# Patient Record
Sex: FEMALE | Race: BLACK OR AFRICAN AMERICAN | NOT HISPANIC OR LATINO | URBAN - METROPOLITAN AREA
[De-identification: names, ages, dates, MRNs, and addresses within clinical notes are randomized per-mention and may not be internally consistent; named-entity substitution may affect disease eponyms.]

---
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
## 2022-07-14 ENCOUNTER — OFFICE VISIT (OUTPATIENT)
Dept: URGENT CARE | Facility: URGENT CARE | Age: 22
End: 2022-07-14
Payer: MEDICARE

## 2022-07-14 VITALS
SYSTOLIC BLOOD PRESSURE: 124 MMHG | HEART RATE: 84 BPM | BODY MASS INDEX: 21.8 KG/M2 | OXYGEN SATURATION: 100 % | RESPIRATION RATE: 12 BRPM | DIASTOLIC BLOOD PRESSURE: 80 MMHG | WEIGHT: 133 LBS | TEMPERATURE: 97 F

## 2022-07-14 DIAGNOSIS — N94.6 DYSMENORRHEA: ICD-10-CM

## 2022-07-14 DIAGNOSIS — R11.2 NAUSEA AND VOMITING, INTRACTABILITY OF VOMITING NOT SPECIFIED, UNSPECIFIED VOMITING TYPE: Primary | ICD-10-CM

## 2022-07-14 LAB
ALBUMIN UR-MCNC: ABNORMAL MG/DL
ANION GAP SERPL CALCULATED.3IONS-SCNC: 8 MMOL/L (ref 3–14)
APPEARANCE UR: CLEAR
BILIRUB UR QL STRIP: NEGATIVE
BUN SERPL-MCNC: 7 MG/DL (ref 7–30)
CALCIUM SERPL-MCNC: 8.9 MG/DL (ref 8.5–10.1)
CHLORIDE BLD-SCNC: 110 MMOL/L (ref 94–109)
CO2 SERPL-SCNC: 24 MMOL/L (ref 20–32)
COLOR UR AUTO: YELLOW
CREAT SERPL-MCNC: 0.72 MG/DL (ref 0.52–1.04)
ERYTHROCYTE [DISTWIDTH] IN BLOOD BY AUTOMATED COUNT: 11.9 % (ref 10–15)
GFR SERPL CREATININE-BSD FRML MDRD: >90 ML/MIN/1.73M2
GLUCOSE BLD-MCNC: 94 MG/DL (ref 70–99)
GLUCOSE UR STRIP-MCNC: NEGATIVE MG/DL
HCG UR QL: NEGATIVE
HCT VFR BLD AUTO: 36.7 % (ref 35–47)
HGB BLD-MCNC: 12.2 G/DL (ref 11.7–15.7)
HGB UR QL STRIP: ABNORMAL
KETONES UR STRIP-MCNC: NEGATIVE MG/DL
LEUKOCYTE ESTERASE UR QL STRIP: NEGATIVE
MCH RBC QN AUTO: 29.1 PG (ref 26.5–33)
MCHC RBC AUTO-ENTMCNC: 33.2 G/DL (ref 31.5–36.5)
MCV RBC AUTO: 88 FL (ref 78–100)
NITRATE UR QL: NEGATIVE
PH UR STRIP: 8 [PH] (ref 5–7)
PLATELET # BLD AUTO: 212 10E3/UL (ref 150–450)
POTASSIUM BLD-SCNC: 3.9 MMOL/L (ref 3.4–5.3)
RBC # BLD AUTO: 4.19 10E6/UL (ref 3.8–5.2)
RBC #/AREA URNS AUTO: ABNORMAL /HPF
SODIUM SERPL-SCNC: 142 MMOL/L (ref 133–144)
SP GR UR STRIP: 1.02 (ref 1–1.03)
SQUAMOUS #/AREA URNS AUTO: ABNORMAL /LPF
TSH SERPL DL<=0.005 MIU/L-ACNC: 1.07 MU/L (ref 0.4–4)
UROBILINOGEN UR STRIP-ACNC: 1 E.U./DL
WBC # BLD AUTO: 6.6 10E3/UL (ref 4–11)
WBC #/AREA URNS AUTO: ABNORMAL /HPF

## 2022-07-14 PROCEDURE — 85027 COMPLETE CBC AUTOMATED: CPT | Performed by: FAMILY MEDICINE

## 2022-07-14 PROCEDURE — 96372 THER/PROPH/DIAG INJ SC/IM: CPT | Performed by: FAMILY MEDICINE

## 2022-07-14 PROCEDURE — 99204 OFFICE O/P NEW MOD 45 MIN: CPT | Mod: 25 | Performed by: FAMILY MEDICINE

## 2022-07-14 PROCEDURE — 80048 BASIC METABOLIC PNL TOTAL CA: CPT | Performed by: FAMILY MEDICINE

## 2022-07-14 PROCEDURE — 81001 URINALYSIS AUTO W/SCOPE: CPT | Performed by: FAMILY MEDICINE

## 2022-07-14 PROCEDURE — 81025 URINE PREGNANCY TEST: CPT | Performed by: FAMILY MEDICINE

## 2022-07-14 PROCEDURE — 84443 ASSAY THYROID STIM HORMONE: CPT | Performed by: FAMILY MEDICINE

## 2022-07-14 PROCEDURE — 36415 COLL VENOUS BLD VENIPUNCTURE: CPT | Performed by: FAMILY MEDICINE

## 2022-07-14 RX ORDER — ONDANSETRON 4 MG/1
4 TABLET, ORALLY DISINTEGRATING ORAL EVERY 8 HOURS PRN
Qty: 12 TABLET | Refills: 0 | Status: SHIPPED | OUTPATIENT
Start: 2022-07-14

## 2022-07-14 RX ORDER — KETOROLAC TROMETHAMINE 30 MG/ML
30 INJECTION, SOLUTION INTRAMUSCULAR; INTRAVENOUS ONCE
Status: COMPLETED | OUTPATIENT
Start: 2022-07-14 | End: 2022-07-14

## 2022-07-14 RX ORDER — NAPROXEN 500 MG/1
500 TABLET ORAL 2 TIMES DAILY PRN
Qty: 40 TABLET | Refills: 0 | Status: SHIPPED | OUTPATIENT
Start: 2022-07-14

## 2022-07-14 RX ORDER — ONDANSETRON 4 MG/1
4 TABLET, ORALLY DISINTEGRATING ORAL ONCE
Status: COMPLETED | OUTPATIENT
Start: 2022-07-14 | End: 2022-07-14

## 2022-07-14 RX ADMIN — KETOROLAC TROMETHAMINE 30 MG: 30 INJECTION, SOLUTION INTRAMUSCULAR; INTRAVENOUS at 15:12

## 2022-07-14 RX ADMIN — ONDANSETRON 4 MG: 4 TABLET, ORALLY DISINTEGRATING ORAL at 15:13

## 2022-07-15 ENCOUNTER — TELEPHONE (OUTPATIENT)
Dept: URGENT CARE | Facility: URGENT CARE | Age: 22
End: 2022-07-15

## 2022-07-15 NOTE — TELEPHONE ENCOUNTER
----- Message from Troy Jenkins MD sent at 7/14/2022  9:04 PM CDT -----  Your thyroid test is normal. The electrolytes and kidney tests are normal. I will have my nurse try to call you with the results to make sure you get the message. Troy Jenkins MD

## 2022-07-15 NOTE — PROGRESS NOTES
Assessment & Plan     Nausea and vomiting, intractability of vomiting not specified, unspecified vomiting type  likely related to dysmenorrhea-improved with zofran here in clinic.    ondansetron (ZOFRAN ODT) ODT tab 4 mg  - HCG qualitative urine  - UA macro with reflex to Microscopic and Culture - [facility] Collect  - HCG qualitative urine  - UA macro with reflex to Microscopic and Culture - [facility] Collect  - Urine Microscopic  - ondansetron (ZOFRAN ODT) 4 MG ODT tab  Dispense: 12 tablet; Refill: 0    Dysmenorrhea  This has been a long time problem for her. This month has been a bit worse than usual. The toradol helped. Imaging does not seem necessary today. We talked about pursuing further testing today but feels comfortable with the plan as it is with close follow up this coming week. She knows to go to the ER with worsening/recurrent symptoms as she potentially would need imaging in that case.    - ketorolac (TORADOL) injection 30 mg  - CBC with platelets  - Basic metabolic panel  - TSH with free T4 reflex  - CBC with platelets  - Basic metabolic panel  - TSH with free T4 reflex  - naproxen (NAPROSYN) 500 MG tablet  Dispense: 40 tablet; Refill: 0  - ondansetron (ZOFRAN ODT) 4 MG ODT tab  Dispense: 12 tablet; Refill: 0       [id number]}    Return in about 5 days (around 7/19/2022) for follow up with your regular clinic if needed.    Troy Jenkins MD  [facility]    ------------------------------------------------------------------------  Subjective     Ester Up presents to clinic today for the following health issues:  chief complaint  HPI  22 yo with hx of painful periods who present on day number one of her period with bad abd. cramping and nausea/vomiting. No fever. No vag discharge.     Denies sti risks. Never had intercourse.     No alcohol, no tobacco, rare Nsaids.       Review of Systems  No const, derm, gi symptoms otherwise. No ent symptoms. No neuro symptoms. At times feels some lightheadedness.        Objective    /80   Pulse 84   Temp 97  F (36.1  C) (Tympanic)   Resp 12   Wt 60.3 kg (133 lb)   LMP 07/14/2022   SpO2 100%   BMI 21.80 kg/m    Physical Exam   GENERAL: healthy, alert and no distress  RESP: lungs clear to auscultation - no rales, rhonchi or wheezes  CV: regular rate and rhythm, normal S1 S2, no S3 or S4, no murmur, click or rub, no peripheral edema and peripheral pulses strong  ABDOMEN: tenderness lower abdomen. No masses.   : deferred  exam.   MS: no gross musculoskeletal defects noted, no edema  SKIN: no suspicious lesions or rashes  NEURO: Normal strength and tone, mentation intact and speech normal    Results for orders placed or performed in visit on 07/14/22   CBC with platelets     Status: Normal   Result Value Ref Range    WBC Count 6.6 4.0 - 11.0 10e3/uL    RBC Count 4.19 3.80 - 5.20 10e6/uL    Hemoglobin 12.2 11.7 - 15.7 g/dL    Hematocrit 36.7 35.0 - 47.0 %    MCV 88 78 - 100 fL    MCH 29.1 26.5 - 33.0 pg    MCHC 33.2 31.5 - 36.5 g/dL    RDW 11.9 10.0 - 15.0 %    Platelet Count 212 150 - 450 10e3/uL   Basic metabolic panel     Status: Abnormal   Result Value Ref Range    Sodium 142 133 - 144 mmol/L    Potassium 3.9 3.4 - 5.3 mmol/L    Chloride 110 (H) 94 - 109 mmol/L    Carbon Dioxide (CO2) 24 20 - 32 mmol/L    Anion Gap 8 3 - 14 mmol/L    Urea Nitrogen 7 7 - 30 mg/dL    Creatinine 0.72 0.52 - 1.04 mg/dL    Calcium 8.9 8.5 - 10.1 mg/dL    Glucose 94 70 - 99 mg/dL    GFR Estimate >90 >60 mL/min/1.73m2   TSH with free T4 reflex     Status: Normal   Result Value Ref Range    TSH 1.07 0.40 - 4.00 mU/L   HCG qualitative urine     Status: Normal   Result Value Ref Range    hCG Urine Qualitative Negative Negative   UA macro with reflex to Microscopic and Culture - Clinc Collect     Status: Abnormal    Specimen: Urine, Clean Catch   Result Value Ref Range    Color Urine Yellow Colorless, Straw, Light Yellow, Yellow    Appearance Urine Clear Clear    Glucose Urine Negative  Negative mg/dL    Bilirubin Urine Negative Negative    Ketones Urine Negative Negative mg/dL    Specific Gravity Urine 1.020 1.003 - 1.035    Blood Urine Large (A) Negative    pH Urine 8.0 (H) 5.0 - 7.0    Protein Albumin Urine Trace (A) Negative mg/dL    Urobilinogen Urine 1.0 0.2, 1.0 E.U./dL    Nitrite Urine Negative Negative    Leukocyte Esterase Urine Negative Negative   Urine Microscopic     Status: Abnormal   Result Value Ref Range    RBC Urine  (A) 0-2 /HPF /HPF    WBC Urine None Seen 0-5 /HPF /HPF    Squamous Epithelials Urine Few (A) None Seen /LPF    Narrative    Urine Culture not indicated

## 2022-08-09 ENCOUNTER — OFFICE VISIT (OUTPATIENT)
Dept: URGENT CARE | Facility: URGENT CARE | Age: 22
End: 2022-08-09
Payer: MEDICARE

## 2022-08-09 VITALS
WEIGHT: 131.8 LBS | TEMPERATURE: 97.9 F | OXYGEN SATURATION: 100 % | DIASTOLIC BLOOD PRESSURE: 75 MMHG | BODY MASS INDEX: 21.6 KG/M2 | HEART RATE: 66 BPM | SYSTOLIC BLOOD PRESSURE: 121 MMHG

## 2022-08-09 DIAGNOSIS — N94.6 DYSMENORRHEA: Primary | ICD-10-CM

## 2022-08-09 PROCEDURE — 96372 THER/PROPH/DIAG INJ SC/IM: CPT | Performed by: INTERNAL MEDICINE

## 2022-08-09 PROCEDURE — 99213 OFFICE O/P EST LOW 20 MIN: CPT | Mod: 25 | Performed by: INTERNAL MEDICINE

## 2022-08-09 RX ORDER — KETOROLAC TROMETHAMINE 30 MG/ML
30 INJECTION, SOLUTION INTRAMUSCULAR; INTRAVENOUS ONCE
Status: COMPLETED | OUTPATIENT
Start: 2022-08-09 | End: 2022-08-09

## 2022-08-09 RX ADMIN — KETOROLAC TROMETHAMINE 30 MG: 30 INJECTION, SOLUTION INTRAMUSCULAR; INTRAVENOUS at 14:50

## 2022-08-09 NOTE — NURSING NOTE
Clinic Administered Medication Documentation    Administrations This Visit     ketorolac (TORADOL) injection 30 mg     Admin Date  08/09/2022 Action  Given Dose  30 mg Route  Intramuscular Site  Right Anterior Thigh Administered By  Greta Sanford [facility]    Ordering Provider: Shayla Colmenares MD    NDC: 72689-779-65    Lot#: [id number]    : ALMAJECT    Patient Supplied?: No                  Injectable Medication Documentation    Patient was given Ketorolac Tromethamine (Toradol). Prior to medication administration, verified patients identity using patient s name and date of birth. Please see MAR and medication order for additional information. Patient instructed to remain in clinic for 15 minutes.      Was entire vial of medication used? Yes  Vial/Syringe: Single dose vial  Expiration Date:  11/2023  Was this medication supplied by the patient? No

## 2022-08-09 NOTE — PROGRESS NOTES
SUBJECTIVE:  Ester Up is an 21 year old female who presents for bad cramps with period.  Pain started last night.  Period started this morning.  Has had pain like this before.  Last month was seen for same thing and given a shot that helped.  Has had bad cramps since about 7th grade.  Sometimes can't eat or sleep because of pain.  Periods last 3-4 days.  Has been on birth control pills in past but didn't seem to regulate periods well.  No vaginal itching or discharge.  Pt reports no concerns for stds and no possibility of pregnancy.      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  Patient Active Problem List   Diagnosis     Depressive disorder     PTSD (post-traumatic stress disorder)     Social History     Socioeconomic History     Marital status: Single     Spouse name: None     Number of children: None     Years of education: None     Highest education level: None   Tobacco Use     Smoking status: Never Smoker     Smokeless tobacco: Never Used   Substance and Sexual Activity     Alcohol use: Yes     Drug use: Yes     Types: Marijuana     Comment: Infrequent use since age 16     Sexual activity: Not Currently     Partners: Female     Birth control/protection: Pill     Comment: total of 4 female partners     Family History   Problem Relation Age of Onset     Hypertension Mother      Post-Traumatic Stress Disorder (PTSD) Mother      Cancer Mother      Cerebrovascular Disease Mother      Seizure Disorder Mother      Depression Mother      Post-Traumatic Stress Disorder (PTSD) Father      Substance Abuse Father         sober x9 years     Post-Traumatic Stress Disorder (PTSD) Sister      Bipolar Disorder Sister      Schizophrenia Sister      Depression Sister       Post-Traumatic Stress Disorder (PTSD) Brother      LUNG DISEASE Maternal Grandmother      Mental Illness Maternal Grandmother      Substance Abuse Maternal Grandmother         ETOH     Depression Maternal Grandmother      Substance Abuse Paternal Uncle      Substance Abuse Maternal Uncle      Depression Maternal Uncle        ALLERGIES:  Seasonal allergies    Current Outpatient Medications   Medication     albuterol (PROAIR HFA/PROVENTIL HFA/VENTOLIN HFA) 108 (90 BASE) MCG/ACT Inhaler     AMITRIPTYLINE HCL PO     CITALOPRAM HYDROBROMIDE PO     naproxen (NAPROSYN) 500 MG tablet     Acetaminophen (TYLENOL PO)     IBUPROFEN PO     norgestrel-ethinyl estradiol (LO/OVRAL) 0.3-30 MG-MCG per tablet     ondansetron (ZOFRAN ODT) 4 MG ODT tab     No current facility-administered medications for this visit.         ROS:  ROS is done and is negative for general/constitutional, eye, ENT, Respiratory, cardiovascular, GI, , Skin, musculoskeletal except as noted elsewhere.  All other review of systems negative except as noted elsewhere.      OBJECTIVE:  /75   Pulse 66   Temp 97.9  F (36.6  C) (Tympanic)   Wt 59.8 kg (131 lb 12.8 oz)   LMP 08/08/2022 (Exact Date)   SpO2 100%   BMI 21.60 kg/m    GENERAL APPEARANCE: Alert, in no acute distress  EYES: normal  NOSE:normal  OROPHARYNX:normal  NECK:No adenopathy,masses or thyromegaly  RESP: normal and clear to auscultation  CV:regular rate and rhythm and no murmurs, clicks, or gallops  ABDOMEN: Abdomen soft. BS normal. Mild diffuse tenderness, no rebound pain. No masses, organomegaly  SKIN: no ulcers, lesions or rash  MUSCULOSKELETAL:Musculoskeletal normal      RESULTS  No results found for any visits on 08/09/22..  No results found for this or any previous visit (from the past 48 hour(s)).  Reviewed labs fromo July 2022  ASSESSMENT/PLAN:    ASSESSMENT / PLAN:  (N94.6) Dysmenorrhea  (primary encounter diagnosis)  Comment: based on hx has recurrent pain with menses.    Plan:  Ob/Gyn Referral, ketorolac (TORADOL) injection         30 mg        Will give toradol for pain now, but advised she f/u with gyn for management and prevention of her menstrual pain.  Pt agreed and appt scheduled for pt. I reviewed supportive care, otc meds to use if needed, expected course, and signs of concern.  Follow up as needed or if she does not improve within 5 day(s) or if worsens in any way.  Reviewed red flag symptoms and is to go to the ER if experiences any of these.      See [facility] for orders, medications, letters, patient instructions    Shayla Colmenares M.D.

## 2022-11-28 ENCOUNTER — TRANSCRIBE ORDERS (OUTPATIENT)
Dept: OTHER | Age: 22
End: 2022-11-28
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
## 2024-11-27 ENCOUNTER — MEDICAL CORRESPONDENCE (OUTPATIENT)
Dept: HEALTH INFORMATION MANAGEMENT | Facility: CLINIC | Age: 24
End: 2024-11-27
Payer: MEDICARE

## 2024-12-02 ENCOUNTER — TRANSCRIBE ORDERS (OUTPATIENT)
Dept: OTHER | Age: 24
End: 2024-12-02

## 2024-12-02 DIAGNOSIS — S73.191D TEAR OF RIGHT ACETABULAR LABRUM, SUBSEQUENT ENCOUNTER: Primary | ICD-10-CM

## 2024-12-10 NOTE — TELEPHONE ENCOUNTER
Action December 10, 2024 2:06 PM MT   Action Taken Sent a request for imaging from [facility].     Action December 12, 2024 9:41 AM MT   Action Taken Sent another request for imaging from [facility], patient's name is too long could be a name discrepancy/mismatch.          DIAGNOSIS: Tear of right acetabular labrum, subsequent encounter [S73.191D]  - Primary   APPOINTMENT DATE: 12/12/2024   NOTES STATUS DETAILS   OFFICE NOTE from referring provider Care Everywhere 11/27/2024 - Ronnie Handley MD - [facility]   MRI In process [facility]:  10/24/2024 - RT Hip   XRAYS (IMAGES & REPORTS) In process [facility]:  10/03/2024 - RT Hip  12/08/2023 - L Spine

## 2024-12-12 ENCOUNTER — OFFICE VISIT (OUTPATIENT)
Dept: ORTHOPEDICS | Facility: CLINIC | Age: 24
End: 2024-12-12
Payer: MEDICARE

## 2024-12-12 ENCOUNTER — PRE VISIT (OUTPATIENT)
Dept: ORTHOPEDICS | Facility: CLINIC | Age: 24
End: 2024-12-12

## 2024-12-12 VITALS — WEIGHT: 140 LBS | HEIGHT: 68 IN | BODY MASS INDEX: 21.22 KG/M2

## 2024-12-12 DIAGNOSIS — S73.191D TEAR OF RIGHT ACETABULAR LABRUM, SUBSEQUENT ENCOUNTER: ICD-10-CM

## 2024-12-12 RX ORDER — TIZANIDINE HYDROCHLORIDE 2 MG/1
2 CAPSULE, GELATIN COATED ORAL 3 TIMES DAILY
COMMUNITY

## 2024-12-12 RX ORDER — GABAPENTIN 300 MG/1
300 CAPSULE ORAL 3 TIMES DAILY
COMMUNITY

## 2024-12-12 RX ORDER — OXYCODONE AND ACETAMINOPHEN 5; 325 MG/1; MG/1
1 TABLET ORAL EVERY 6 HOURS PRN
COMMUNITY

## 2024-12-12 ASSESSMENT — HOOS JR
GOING UP OR DOWN STAIRS: EXTREME
BENDING TO THE FLOOR TO PICK UP OBJECT: EXTREME
HOOS JR TOTAL INTERVAL SCORE: 15.63
WALKING ON UNEVEN SURFACE: EXTREME
RISING FROM SITTING: EXTREME
LYING IN BED (TURNING OVER, MAINTAINING HIP POSITION): EXTREME
SITTING: MODERATE
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
## 2024-12-30 ENCOUNTER — OFFICE VISIT (OUTPATIENT)
Dept: ORTHOPEDICS | Facility: CLINIC | Age: 24
End: 2024-12-30
Payer: MEDICARE

## 2024-12-30 DIAGNOSIS — S73.191D TEAR OF RIGHT ACETABULAR LABRUM, SUBSEQUENT ENCOUNTER: ICD-10-CM

## 2024-12-30 PROCEDURE — 20611 DRAIN/INJ JOINT/BURSA W/US: CPT | Mod: RT | Performed by: FAMILY MEDICINE

## 2024-12-30 RX ORDER — LIDOCAINE HYDROCHLORIDE 10 MG/ML
2 INJECTION, SOLUTION EPIDURAL; INFILTRATION; INTRACAUDAL; PERINEURAL
Status: COMPLETED | OUTPATIENT
Start: 2024-12-30 | End: 2024-12-30

## 2024-12-30 RX ORDER — LIDOCAINE HYDROCHLORIDE 10 MG/ML
3 INJECTION, SOLUTION EPIDURAL; INFILTRATION; INTRACAUDAL; PERINEURAL
Status: COMPLETED | OUTPATIENT
Start: 2024-12-30 | End: 2024-12-30

## 2024-12-30 RX ORDER — TRIAMCINOLONE ACETONIDE 40 MG/ML
40 INJECTION, SUSPENSION INTRA-ARTICULAR; INTRAMUSCULAR
Status: COMPLETED | OUTPATIENT
Start: 2024-12-30 | End: 2024-12-30

## 2024-12-30 RX ADMIN — LIDOCAINE HYDROCHLORIDE 2 ML: 10 INJECTION, SOLUTION EPIDURAL; INFILTRATION; INTRACAUDAL; PERINEURAL at 14:48

## 2024-12-30 RX ADMIN — TRIAMCINOLONE ACETONIDE 40 MG: 40 INJECTION, SUSPENSION INTRA-ARTICULAR; INTRAMUSCULAR at 14:48

## 2024-12-30 RX ADMIN — LIDOCAINE HYDROCHLORIDE 3 ML: 10 INJECTION, SOLUTION EPIDURAL; INFILTRATION; INTRACAUDAL; PERINEURAL at 14:48

## 2024-12-30 NOTE — PROGRESS NOTES
[facility]  SPORTS & ORTHOPEDIC CLINIC VISIT     Dec 30, 2024      USG right hip joint injection    Date/Time: 12/30/2024 2:48 PM    Performed by: Wiliam Alexander MD  Authorized by: Wiliam Alexander MD    Indications:  Pain  Needle Size:  22 G  Guidance: ultrasound    Approach:  Anterior  Location:  Hip      Site:  R hip joint  Medications:  40 mg triamcinolone 40 MG/ML; 3 mL lidocaine (PF) 1 %; 2 mL lidocaine (PF) 1 %  Outcome:  Tolerated well, no immediate complications  Procedure discussed: discussed risks, benefits, and alternatives    Consent Given by:  Patient  Timeout: timeout called immediately prior to procedure    Prep: patient was prepped and draped in usual sterile fashion     Patient was positioned lying on exam table. Utilizing ultrasound, the femoral head neck junction was visualized and marked with a pen. Ultrasound was utilized to ensure safety of injection clear of neurovascular structures. Next the area was cleaned with a chlorhexadine swab. Using sterile technique, and continuous ultrasound visualization, 3mL 1% lidocaine was injected through a 25g needle along the injection tract. Next, a 22g spinal needle was introduced into the joint at the level of the femoral head/neck junction under direct and continuous ultrasound guidance. A solution of 2mL 1% lidocaine and 40mg triamcinolone was injected and seen flowing through the joint. Images were captured and saved to the patient's permanent record. Patient tolerated the procedure well. No immediate complications. Routine postinjection instructions were given. Follow up promptly if significant increase in pain, warmth, redness from the injection site.   Recommended follow up with Dr. Cardona's team with report of her response to the injection after two weeks.     Wiliam Alexander MD

## 2024-12-30 NOTE — LETTER
12/30/2024      RE: Ester Up  [address]  [facility] [address]     Dear Colleague,    Thank you for referring your patient, Ester Up, to the [facility] [place]. Please see a copy of my visit note below.    [facility]  SPORTS & ORTHOPEDIC CLINIC VISIT     Dec 30, 2024      USG right hip joint injection    Date/Time: 12/30/2024 2:48 PM    Performed by: Wiliam Alexander MD  Authorized by: Wiliam Alexander MD    Indications:  Pain  Needle Size:  22 G  Guidance: ultrasound    Approach:  Anterior  Location:  Hip      Site:  R hip joint  Medications:  40 mg triamcinolone 40 MG/ML; 3 mL lidocaine (PF) 1 %; 2 mL lidocaine (PF) 1 %  Outcome:  Tolerated well, no immediate complications  Procedure discussed: discussed risks, benefits, and alternatives    Consent Given by:  Patient  Timeout: timeout called immediately prior to procedure    Prep: patient was prepped and draped in usual sterile fashion     Patient was positioned lying on exam table. Utilizing ultrasound, the femoral head neck junction was visualized and marked with a pen. Ultrasound was utilized to ensure safety of injection clear of neurovascular structures. Next the area was cleaned with a chlorhexadine swab. Using sterile technique, and continuous ultrasound visualization, 3mL 1% lidocaine was injected through a 25g needle along the injection tract. Next, a 22g spinal needle was introduced into the joint at the level of the femoral head/neck junction under direct and continuous ultrasound guidance. A solution of 2mL 1% lidocaine and 40mg triamcinolone was injected and seen flowing through the joint. Images were captured and saved to the patient's permanent record. Patient tolerated the procedure well. No immediate complications. Routine postinjection instructions were given. Follow up promptly if significant increase in pain, warmth, redness from the injection  site.   Recommended follow up with Dr. Cardona's team with report of her response to the injection after two weeks.     Wiliam Alexander MD            Again, thank you for allowing me to participate in the care of your patient.      Sincerely,    Wiliam Alexander MD

## 2024-12-30 NOTE — NURSING NOTE
[address] [address]  Dept: [phone]  ______________________________________________________________________________    Patient: Ester Up   : 2000   MRN: [id number]   2024    INVASIVE PROCEDURE SAFETY CHECKLIST    Date: 2024   Procedure:R Hip USG CSI   Patient Name: Ester Up  MRN: [id number]  YOB: 2000    Action: Complete sections as appropriate. Any discrepancy results in a HARD COPY until resolved.     PRE PROCEDURE:  Patient ID verified with 2 identifiers (name and  or MRN): Yes  Procedure and site verified with patient/designee (when able): Yes  Accurate consent documentation in medical record: Yes  H&P (or appropriate assessment) documented in medical record: Yes  H&P must be up to 20 days prior to procedure and updates within 24 hours of procedure as applicable: Yes  Relevant diagnostic and radiology test results appropriately labeled and displayed as applicable: Yes  Procedure site(s) marked with provider initials: NA    TIMEOUT:  Time-Out performed immediately prior to starting procedure, including verbal and active participation of all team members addressing the following:Yes  * Correct patient identify  * Confirmed that the correct side and site are marked  * An accurate procedure consent form  * Agreement on the procedure to be done  * Correct patient position  * Relevant images and results are properly labeled and appropriately displayed  * The need to administer antibiotics or fluids for irrigation purposes during the procedure as applicable   * Safety precautions based on patient history or medication use    DURING PROCEDURE: Verification of correct person, site, and procedures any time the responsibility for care of the patient is transferred to another member of the care team.       Prior to injection, verified patient identity using patient's name and date of  birth.  Due to injection administration, patient instructed to remain in clinic for 15 minutes  afterwards, and to report any adverse reaction to me immediately.    Joint injection was performed.      Drug Amount Wasted:  None.  Vial/Syringe: Single dose vial  Expiration Date:  7/30/26 7/30/28      Krupa Pisaon ATC  December 30, 2024

## (undated) RX ORDER — LIDOCAINE HYDROCHLORIDE 10 MG/ML
INJECTION, SOLUTION EPIDURAL; INFILTRATION; INTRACAUDAL; PERINEURAL
Status: DISPENSED
Start: 2024-12-30

## (undated) RX ORDER — TRIAMCINOLONE ACETONIDE 40 MG/ML
INJECTION, SUSPENSION INTRA-ARTICULAR; INTRAMUSCULAR
Status: DISPENSED
Start: 2024-12-30